# Patient Record
(demographics unavailable — no encounter records)

---

## 2020-12-18 NOTE — NUR
Patient discharged to home in stable condition.  Written and verbal after care 
instructions given. 

Patient verbalizes understanding of instructions. Stressed follow up or return 
to ER for worsening s/s.


-------------------------------------------------------------------------------

Addendum: 12/18/20 at 1642 by DARRELL

-------------------------------------------------------------------------------

pt refuses any pain/nausea med.